# Patient Record
Sex: MALE | Race: WHITE | NOT HISPANIC OR LATINO
[De-identification: names, ages, dates, MRNs, and addresses within clinical notes are randomized per-mention and may not be internally consistent; named-entity substitution may affect disease eponyms.]

---

## 2022-03-07 PROBLEM — Z00.00 ENCOUNTER FOR PREVENTIVE HEALTH EXAMINATION: Status: ACTIVE | Noted: 2022-03-07

## 2022-03-20 ENCOUNTER — NON-APPOINTMENT (OUTPATIENT)
Age: 62
End: 2022-03-20

## 2022-03-24 ENCOUNTER — NON-APPOINTMENT (OUTPATIENT)
Age: 62
End: 2022-03-24

## 2022-03-24 ENCOUNTER — APPOINTMENT (OUTPATIENT)
Dept: SURGICAL ONCOLOGY | Facility: CLINIC | Age: 62
End: 2022-03-24
Payer: COMMERCIAL

## 2022-03-24 VITALS
WEIGHT: 160 LBS | OXYGEN SATURATION: 98 % | HEIGHT: 72 IN | HEART RATE: 83 BPM | SYSTOLIC BLOOD PRESSURE: 119 MMHG | DIASTOLIC BLOOD PRESSURE: 75 MMHG | BODY MASS INDEX: 21.67 KG/M2

## 2022-03-24 DIAGNOSIS — Z80.3 FAMILY HISTORY OF MALIGNANT NEOPLASM OF BREAST: ICD-10-CM

## 2022-03-24 DIAGNOSIS — Z78.9 OTHER SPECIFIED HEALTH STATUS: ICD-10-CM

## 2022-03-24 DIAGNOSIS — F17.210 NICOTINE DEPENDENCE, CIGARETTES, UNCOMPLICATED: ICD-10-CM

## 2022-03-24 PROCEDURE — 99205 OFFICE O/P NEW HI 60 MIN: CPT

## 2022-03-25 NOTE — ASSESSMENT
[FreeTextEntry1] : Mr. ROSEANNE BURTON  is a 61 year  old male who underwent a shave biopsy of a left anterior shoulder lesion on 2/25/2022 revealing a malignant melanoma, nodule type, Breslow thickness of 0.5 mm, no mitosis, no ulceration or regression. The lesion extends to the base and peripheral margins. Pathologic stage: pT1a\par \par PLAN:\par [facility] pathology review \par Wide local excision of left shoulder T1 melanoma. We discussed the risks, benefits, and alternatives of the procedure with the patient, he expressed understanding and agree to proceed.

## 2022-03-25 NOTE — CONSULT LETTER
[Dear  ___] : Dear ~CARL, [Consult Letter:] : I had the pleasure of evaluating your patient, [unfilled]. [Please see my note below.] : Please see my note below. [Consult Closing:] : Thank you very much for allowing me to participate in the care of this patient.  If you have any questions, please do not hesitate to contact me. [Sincerely,] : Sincerely, [FreeTextEntry3] : Troy Osorio MD, MPH, FACS, FSSO\par , [facility] General Surgical Oncology Fellowship\par [facility] [place]\par Associate Professor of Surgery\par [employer] [facility]

## 2022-03-25 NOTE — HISTORY OF PRESENT ILLNESS
[de-identified] : Mr. ROSEANNE BURTON  is a 61 year  old male  presenting for an initial consultation for newly diagnosed left shoulder melanoma, referred by ANGELINE Chavez. \par \par Left anterior shoulder shave biopsy 2/25/2022: Malignant melanoma, nodule type, Breslow thickness of 0.5 mm, no mitosis, no ulceration or regression. The lesion extends to the base and peripheral margins. Pathologic stage: pT1a\par \par Mr. BURTON reports a longstanding history of sun exposure without the use of sunblock. History of sunburns.  Denies any other concerning lesions or masses. Denies bleeding or pruritic moles. Denies pain or constitutional symptoms.\par \par NKDA; Does not take any medications\par PMH: None\par PSH: Skin biopsy \par Social Hx: Smoker x 30 years, 1 alcoholic beverage per week, \par Family Hx: Sister with breast cancer @ 54 y/o

## 2022-04-14 ENCOUNTER — LABORATORY RESULT (OUTPATIENT)
Age: 62
End: 2022-04-14

## 2022-04-14 ENCOUNTER — APPOINTMENT (OUTPATIENT)
Dept: SURGICAL ONCOLOGY | Facility: CLINIC | Age: 62
End: 2022-04-14
Payer: COMMERCIAL

## 2022-04-14 VITALS
HEIGHT: 72 IN | WEIGHT: 155 LBS | DIASTOLIC BLOOD PRESSURE: 72 MMHG | BODY MASS INDEX: 20.99 KG/M2 | RESPIRATION RATE: 16 BRPM | TEMPERATURE: 97.8 F | OXYGEN SATURATION: 98 % | HEART RATE: 90 BPM | SYSTOLIC BLOOD PRESSURE: 119 MMHG

## 2022-04-14 PROCEDURE — 14301 TIS TRNFR ANY 30.1-60 SQ CM: CPT

## 2022-04-14 NOTE — PROCEDURE
[Excision Of Lesion] : excision of lesion [Patient] : patient [Risks] : risks [Benefits] : benefits [Alternatives] : alternatives [___ ml Inj] : [unfilled] ~Uml [1%] : 1% [With Epi] : with epinephrine [Lesions On Shoulders] : left shoulder [Elsi] : using Elsi [Excisional: Margin ___mm] : the lesion was excised with a  [unfilled] ~Umm margin in an elliptical fashion [Cautery] : cautery [___ # of Sutures] : [unfilled] [Size: ___-0] : [unfilled]-0 [Running] : running [Vicryl] : Vicryl suture(s) [Sent to Pathology] : the excised lesion was place in buffered formalin and sent for pathology [Tolerated Well] : the patient tolerated the procedure well [No Complications] : there were no complications [___ Week(s)] : in [unfilled] week(s) [de-identified] : melanoma left anterior shoulder [FreeTextEntry5] : left anterior shoulder 3.2 x 6.0 cm excision [de-identified] : tissue was undermined and flaps were rotated into the incision to allow for a tension free closure of dermis and epidermis - total area 31.2 sq cm [de-identified] : left anterior shoulder melanoma short superior long lateral

## 2022-04-28 ENCOUNTER — APPOINTMENT (OUTPATIENT)
Age: 62
End: 2022-04-28
Payer: COMMERCIAL

## 2022-04-28 VITALS
HEIGHT: 72 IN | RESPIRATION RATE: 16 BRPM | OXYGEN SATURATION: 95 % | WEIGHT: 161.44 LBS | HEART RATE: 91 BPM | SYSTOLIC BLOOD PRESSURE: 110 MMHG | DIASTOLIC BLOOD PRESSURE: 70 MMHG | BODY MASS INDEX: 21.87 KG/M2

## 2022-04-28 DIAGNOSIS — C43.62 MALIGNANT MELANOMA OF LEFT UPPER LIMB, INCLUDING SHOULDER: ICD-10-CM

## 2022-04-28 PROCEDURE — 99024 POSTOP FOLLOW-UP VISIT: CPT

## 2022-05-02 NOTE — CONSULT LETTER
[Dear  ___] : Dear ~CARL, [Consult Letter:] : I had the pleasure of evaluating your patient, [unfilled]. [Please see my note below.] : Please see my note below. [Consult Closing:] : Thank you very much for allowing me to participate in the care of this patient.  If you have any questions, please do not hesitate to contact me. [Sincerely,] : Sincerely, [FreeTextEntry3] : Troy Osorio MD, MPH, FACS, FSSO\par , [facility] General Surgical Oncology Fellowship\par [facility] [place]\par Associate Professor of Surgery\par [employer] [facility]

## 2022-05-02 NOTE — HISTORY OF PRESENT ILLNESS
[de-identified] : Mr. ROSEANNE BURTON  is a 61 year  old male  presenting for a post procedure follow up visit. \par He was seen in initial consultation on 3/24/22 for newly diagnosed left shoulder melanoma, referred by ANGELINE Chavez. \par \par Left anterior shoulder shave biopsy 2/25/2022: Malignant melanoma, nodule type, Breslow thickness of 0.5 mm, no mitosis, no ulceration or regression. The lesion extends to the base and peripheral margins. Pathologic stage: pT1a\par \par Mr. BURTON reports a longstanding history of sun exposure without the use of sunblock. History of sunburns.  Denies any other concerning lesions or masses. Denies bleeding or pruritic moles. Denies pain or constitutional symptoms.\par \par NKDA; Does not take any medications\par PMH: None\par PSH: Skin biopsy \par Social Hx: Smoker x 30 years, 1 alcoholic beverage per week, \par Family Hx: Sister with breast cancer @ 56 y/o \par \par INTERVAL HISTORY:\par ***OFFICE PROCEDURE 4/14/22- S/p WLE of left anterior shoulder T1 (0.5 mm) melanoma\par ***FINAL PATHOLOGY - pending \par \par 4/28/22- Denies pain, fever or chills.

## 2022-05-02 NOTE — PHYSICAL EXAM
[Normal] : well developed, well nourished, in no acute distress [de-identified] : healing left shoulder incision with no evidence of infection

## 2022-05-02 NOTE — ASSESSMENT
[FreeTextEntry1] : Mr. ROSEANNE BURTON  is a 61 year  old male who underwent a shave biopsy of a left anterior shoulder lesion on 2/25/2022 revealing a malignant melanoma, nodule type, Breslow thickness of 0.5 mm, no mitosis, no ulceration or regression. The lesion extends to the base and peripheral margins. Pathologic stage: pT1a\par \par ***OFFICE PROCEDURE 4/14/22- S/p WLE of left anterior shoulder T1 (0.5 mm) melanoma\par ***FINAL PATHOLOGY - pending \par \par No evidence of infection \par \par PLAN:\par 1) pt. will call on Tuesday, 5/3 for final pathology results \par 2) RTO in 3 months\par 3) Continue f/u with dermatology for full body skin checks

## 2022-05-05 ENCOUNTER — NON-APPOINTMENT (OUTPATIENT)
Age: 62
End: 2022-05-05

## 2022-08-04 ENCOUNTER — APPOINTMENT (OUTPATIENT)
Dept: SURGICAL ONCOLOGY | Facility: CLINIC | Age: 62
End: 2022-08-04

## 2023-08-05 ENCOUNTER — NON-APPOINTMENT (OUTPATIENT)
Age: 63
End: 2023-08-05

## 2025-02-02 ENCOUNTER — NON-APPOINTMENT (OUTPATIENT)
Age: 65
End: 2025-02-02